# Patient Record
Sex: FEMALE | Race: WHITE | NOT HISPANIC OR LATINO
[De-identification: names, ages, dates, MRNs, and addresses within clinical notes are randomized per-mention and may not be internally consistent; named-entity substitution may affect disease eponyms.]

---

## 2019-05-15 ENCOUNTER — NON-APPOINTMENT (OUTPATIENT)
Age: 44
End: 2019-05-15

## 2019-05-15 ENCOUNTER — APPOINTMENT (OUTPATIENT)
Dept: CARDIOLOGY | Facility: CLINIC | Age: 44
End: 2019-05-15
Payer: COMMERCIAL

## 2019-05-15 ENCOUNTER — APPOINTMENT (OUTPATIENT)
Dept: CARDIOLOGY | Facility: CLINIC | Age: 44
End: 2019-05-15

## 2019-05-15 VITALS
OXYGEN SATURATION: 100 % | TEMPERATURE: 98.6 F | BODY MASS INDEX: 20.02 KG/M2 | SYSTOLIC BLOOD PRESSURE: 117 MMHG | RESPIRATION RATE: 12 BRPM | HEIGHT: 63 IN | DIASTOLIC BLOOD PRESSURE: 83 MMHG | HEART RATE: 79 BPM | WEIGHT: 113 LBS

## 2019-05-15 DIAGNOSIS — R07.89 OTHER CHEST PAIN: ICD-10-CM

## 2019-05-15 PROCEDURE — 93000 ELECTROCARDIOGRAM COMPLETE: CPT

## 2019-05-15 PROCEDURE — 99214 OFFICE O/P EST MOD 30 MIN: CPT | Mod: 25

## 2019-05-15 NOTE — DISCUSSION/SUMMARY
[___ Year(s)] : [unfilled] year(s) [FreeTextEntry1] : The patient is a 44-year-old female history of bicuspid valve, aortic regurgitation, mildly dilated aortic root with an atypical chest pain episode.\par #1 Chest pain- exercise stress test\par #2 AV/dilated root- ECHO ordered\par #3 Lipids- diet controlled, increase exercise.

## 2019-05-15 NOTE — HISTORY OF PRESENT ILLNESS
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 She had an episode of substernal chest tightness that was nonradiating. It lasted a minute or two and has not recurred. She is trying to eat a more plant based diet and exercise to improve her lipid panel.

## 2019-05-15 NOTE — PHYSICAL EXAM
[General Appearance - Well Developed] : well developed [Normal Appearance] : normal appearance [Well Groomed] : well groomed [General Appearance - Well Nourished] : well nourished [No Deformities] : no deformities [General Appearance - In No Acute Distress] : no acute distress [Normal Conjunctiva] : the conjunctiva exhibited no abnormalities [No Oral Pallor] : no oral pallor [Normal Oral Mucosa] : normal oral mucosa [Eyelids - No Xanthelasma] : the eyelids demonstrated no xanthelasmas [Normal Jugular Venous A Waves Present] : normal jugular venous A waves present [No Oral Cyanosis] : no oral cyanosis [No Jugular Venous Khan A Waves] : no jugular venous khan A waves [Normal Jugular Venous V Waves Present] : normal jugular venous V waves present [Respiration, Rhythm And Depth] : normal respiratory rhythm and effort [Auscultation Breath Sounds / Voice Sounds] : lungs were clear to auscultation bilaterally [Heart Rate And Rhythm] : heart rate and rhythm were normal [Exaggerated Use Of Accessory Muscles For Inspiration] : no accessory muscle use [Heart Sounds] : normal S1 and S2 [Abdomen Soft] : soft [Murmurs] : no murmurs present [Abdomen Mass (___ Cm)] : no abdominal mass palpated [Abdomen Tenderness] : non-tender [Nail Clubbing] : no clubbing of the fingernails [Abnormal Walk] : normal gait [Gait - Sufficient For Exercise Testing] : the gait was sufficient for exercise testing [Cyanosis, Localized] : no localized cyanosis [Petechial Hemorrhages (___cm)] : no petechial hemorrhages [] : no rash [Skin Color & Pigmentation] : normal skin color and pigmentation [No Venous Stasis] : no venous stasis [No Skin Ulcers] : no skin ulcer [Skin Lesions] : no skin lesions [No Xanthoma] : no  xanthoma was observed [Oriented To Time, Place, And Person] : oriented to person, place, and time [Affect] : the affect was normal [No Anxiety] : not feeling anxious [Mood] : the mood was normal

## 2019-05-15 NOTE — REVIEW OF SYSTEMS
[Negative] : Heme/Lymph [Shortness Of Breath] : no shortness of breath [Recent Weight Loss (___ Lbs)] : no recent weight loss [Dyspnea on exertion] : not dyspnea during exertion [Chest Pain] : no chest pain [Palpitations] : no palpitations [Lower Ext Edema] : no extremity edema

## 2019-06-19 ENCOUNTER — APPOINTMENT (OUTPATIENT)
Dept: CARDIOLOGY | Facility: CLINIC | Age: 44
End: 2019-06-19
Payer: COMMERCIAL

## 2019-06-19 PROCEDURE — 93015 CV STRESS TEST SUPVJ I&R: CPT

## 2019-06-19 PROCEDURE — 93306 TTE W/DOPPLER COMPLETE: CPT

## 2019-07-11 ENCOUNTER — FORM ENCOUNTER (OUTPATIENT)
Age: 44
End: 2019-07-11

## 2019-07-12 ENCOUNTER — OUTPATIENT (OUTPATIENT)
Dept: OUTPATIENT SERVICES | Facility: HOSPITAL | Age: 44
LOS: 1 days | End: 2019-07-12
Payer: COMMERCIAL

## 2019-07-12 ENCOUNTER — APPOINTMENT (OUTPATIENT)
Dept: CARDIOLOGY | Facility: CLINIC | Age: 44
End: 2019-07-12

## 2019-07-12 DIAGNOSIS — I71.2 THORACIC AORTIC ANEURYSM, WITHOUT RUPTURE: ICD-10-CM

## 2019-07-12 DIAGNOSIS — R07.9 CHEST PAIN, UNSPECIFIED: ICD-10-CM

## 2019-07-12 PROCEDURE — 75574 CT ANGIO HRT W/3D IMAGE: CPT | Mod: 26

## 2019-07-12 PROCEDURE — 75574 CT ANGIO HRT W/3D IMAGE: CPT

## 2019-07-15 ENCOUNTER — TRANSCRIPTION ENCOUNTER (OUTPATIENT)
Age: 44
End: 2019-07-15

## 2019-10-16 ENCOUNTER — EMERGENCY (EMERGENCY)
Facility: HOSPITAL | Age: 44
LOS: 1 days | Discharge: HOME | End: 2019-10-16
Attending: EMERGENCY MEDICINE
Payer: COMMERCIAL

## 2019-10-16 VITALS
RESPIRATION RATE: 18 BRPM | OXYGEN SATURATION: 100 % | SYSTOLIC BLOOD PRESSURE: 118 MMHG | WEIGHT: 113.98 LBS | DIASTOLIC BLOOD PRESSURE: 77 MMHG | HEIGHT: 67 IN | TEMPERATURE: 98 F | HEART RATE: 81 BPM

## 2019-10-16 LAB
ALBUMIN SERPL ELPH-MCNC: 4.2 G/DL — SIGNIFICANT CHANGE UP (ref 3.3–5.2)
ALP SERPL-CCNC: 48 U/L — SIGNIFICANT CHANGE UP (ref 40–120)
ALT FLD-CCNC: 13 U/L — SIGNIFICANT CHANGE UP
ANION GAP SERPL CALC-SCNC: 12 MMOL/L — SIGNIFICANT CHANGE UP (ref 5–17)
AST SERPL-CCNC: 20 U/L — SIGNIFICANT CHANGE UP
BILIRUB SERPL-MCNC: 0.4 MG/DL — SIGNIFICANT CHANGE UP (ref 0.4–2)
BUN SERPL-MCNC: 11 MG/DL — SIGNIFICANT CHANGE UP (ref 8–20)
CALCIUM SERPL-MCNC: 9.4 MG/DL — SIGNIFICANT CHANGE UP (ref 8.6–10.2)
CHLORIDE SERPL-SCNC: 106 MMOL/L — SIGNIFICANT CHANGE UP (ref 98–107)
CO2 SERPL-SCNC: 23 MMOL/L — SIGNIFICANT CHANGE UP (ref 22–29)
CREAT SERPL-MCNC: 0.6 MG/DL — SIGNIFICANT CHANGE UP (ref 0.5–1.3)
GLUCOSE SERPL-MCNC: 93 MG/DL — SIGNIFICANT CHANGE UP (ref 70–115)
HCT VFR BLD CALC: 40.9 % — SIGNIFICANT CHANGE UP (ref 34.5–45)
HGB BLD-MCNC: 13 G/DL — SIGNIFICANT CHANGE UP (ref 11.5–15.5)
INR BLD: 1.04 RATIO — SIGNIFICANT CHANGE UP (ref 0.88–1.16)
LIDOCAIN IGE QN: 34 U/L — SIGNIFICANT CHANGE UP (ref 22–51)
MAGNESIUM SERPL-MCNC: 2.2 MG/DL — SIGNIFICANT CHANGE UP (ref 1.8–2.6)
MCHC RBC-ENTMCNC: 27.5 PG — SIGNIFICANT CHANGE UP (ref 27–34)
MCHC RBC-ENTMCNC: 31.8 GM/DL — LOW (ref 32–36)
MCV RBC AUTO: 86.5 FL — SIGNIFICANT CHANGE UP (ref 80–100)
NT-PROBNP SERPL-SCNC: 106 PG/ML — SIGNIFICANT CHANGE UP (ref 0–300)
PLATELET # BLD AUTO: 249 K/UL — SIGNIFICANT CHANGE UP (ref 150–400)
POTASSIUM SERPL-MCNC: 4 MMOL/L — SIGNIFICANT CHANGE UP (ref 3.5–5.3)
POTASSIUM SERPL-SCNC: 4 MMOL/L — SIGNIFICANT CHANGE UP (ref 3.5–5.3)
PROT SERPL-MCNC: 6.6 G/DL — SIGNIFICANT CHANGE UP (ref 6.6–8.7)
PROTHROM AB SERPL-ACNC: 12 SEC — SIGNIFICANT CHANGE UP (ref 10–12.9)
RBC # BLD: 4.73 M/UL — SIGNIFICANT CHANGE UP (ref 3.8–5.2)
RBC # FLD: 12.6 % — SIGNIFICANT CHANGE UP (ref 10.3–14.5)
SODIUM SERPL-SCNC: 141 MMOL/L — SIGNIFICANT CHANGE UP (ref 135–145)
TROPONIN T SERPL-MCNC: <0.01 NG/ML — SIGNIFICANT CHANGE UP (ref 0–0.06)
WBC # BLD: 4.85 K/UL — SIGNIFICANT CHANGE UP (ref 3.8–10.5)
WBC # FLD AUTO: 4.85 K/UL — SIGNIFICANT CHANGE UP (ref 3.8–10.5)

## 2019-10-16 PROCEDURE — 99285 EMERGENCY DEPT VISIT HI MDM: CPT

## 2019-10-16 PROCEDURE — 93010 ELECTROCARDIOGRAM REPORT: CPT

## 2019-10-16 PROCEDURE — 93306 TTE W/DOPPLER COMPLETE: CPT | Mod: 26

## 2019-10-16 PROCEDURE — 71046 X-RAY EXAM CHEST 2 VIEWS: CPT | Mod: 26

## 2019-10-16 NOTE — ED ADULT TRIAGE NOTE - CHIEF COMPLAINT QUOTE
"I missed a dose of my metoprolol on sunday. I started feeling weird on monday.  I woke up this morning and was short of breath, dizzy and I wasn't feeling right".  States she has a hx of 4.4 cm ascending aortic aneurysm x12 year and a bicuspid aortic valve.  c/o cold symptoms as well, cough x 1 week.

## 2019-10-16 NOTE — ED STATDOCS - PROGRESS NOTE DETAILS
43 y/o with ascending aortic aneurysm c/o intermittent episodes of difficulty catching breath. also presenting with minimal URI symptoms last week. Patient concerned about her heart. Vitals stable. Will be sent to main ER on monitor for further assessment.

## 2019-10-17 VITALS
HEART RATE: 77 BPM | SYSTOLIC BLOOD PRESSURE: 100 MMHG | DIASTOLIC BLOOD PRESSURE: 64 MMHG | RESPIRATION RATE: 20 BRPM | TEMPERATURE: 98 F | OXYGEN SATURATION: 99 %

## 2019-10-17 DIAGNOSIS — I71.2 THORACIC AORTIC ANEURYSM, WITHOUT RUPTURE: ICD-10-CM

## 2019-10-17 DIAGNOSIS — Q23.1 CONGENITAL INSUFFICIENCY OF AORTIC VALVE: ICD-10-CM

## 2019-10-17 LAB
HCG SERPL-ACNC: <4 MIU/ML — SIGNIFICANT CHANGE UP
TROPONIN T SERPL-MCNC: <0.01 NG/ML — SIGNIFICANT CHANGE UP (ref 0–0.06)

## 2019-10-17 PROCEDURE — 93005 ELECTROCARDIOGRAM TRACING: CPT

## 2019-10-17 PROCEDURE — 99284 EMERGENCY DEPT VISIT MOD MDM: CPT | Mod: 25

## 2019-10-17 PROCEDURE — 84484 ASSAY OF TROPONIN QUANT: CPT

## 2019-10-17 PROCEDURE — 74174 CTA ABD&PLVS W/CONTRAST: CPT | Mod: 26

## 2019-10-17 PROCEDURE — 36415 COLL VENOUS BLD VENIPUNCTURE: CPT

## 2019-10-17 PROCEDURE — G0378: CPT

## 2019-10-17 PROCEDURE — 85610 PROTHROMBIN TIME: CPT

## 2019-10-17 PROCEDURE — 85027 COMPLETE CBC AUTOMATED: CPT

## 2019-10-17 PROCEDURE — 83880 ASSAY OF NATRIURETIC PEPTIDE: CPT

## 2019-10-17 PROCEDURE — 84702 CHORIONIC GONADOTROPIN TEST: CPT

## 2019-10-17 PROCEDURE — 80053 COMPREHEN METABOLIC PANEL: CPT

## 2019-10-17 PROCEDURE — 83690 ASSAY OF LIPASE: CPT

## 2019-10-17 PROCEDURE — 99236 HOSP IP/OBS SAME DATE HI 85: CPT

## 2019-10-17 PROCEDURE — 93306 TTE W/DOPPLER COMPLETE: CPT

## 2019-10-17 PROCEDURE — 71046 X-RAY EXAM CHEST 2 VIEWS: CPT

## 2019-10-17 PROCEDURE — 71275 CT ANGIOGRAPHY CHEST: CPT

## 2019-10-17 PROCEDURE — 71275 CT ANGIOGRAPHY CHEST: CPT | Mod: 26

## 2019-10-17 PROCEDURE — 83735 ASSAY OF MAGNESIUM: CPT

## 2019-10-17 PROCEDURE — 74174 CTA ABD&PLVS W/CONTRAST: CPT

## 2019-10-17 RX ORDER — METOPROLOL TARTRATE 50 MG
12.5 TABLET ORAL DAILY
Refills: 0 | Status: DISCONTINUED | OUTPATIENT
Start: 2019-10-17 | End: 2019-10-22

## 2019-10-17 NOTE — ED CDU PROVIDER DISPOSITION NOTE - PATIENT PORTAL LINK FT
You can access the FollowMyHealth Patient Portal offered by [facility] by registering at the following website: [email or web address]. By joining [facility]’s FollowMyHealth portal, you will also be able to view your health information using other applications (apps) compatible with our system.

## 2019-10-17 NOTE — ED CDU PROVIDER INITIAL DAY NOTE - MEDICAL DECISION MAKING DETAILS
female with hx of thoracic aortic aneurysm with recent cold symptoms and missed dose of BB, anxious. pending TTE reading. female with hx of thoracic aortic aneurysm with recent cold symptoms and missed dose of BB, anxious 2/2 to cardiac hx. vitals stable pending TTE reading.

## 2019-10-17 NOTE — ED PROVIDER NOTE - CLINICAL SUMMARY MEDICAL DECISION MAKING FREE TEXT BOX
43 y/o pt with an aortic aneurysm presents to the ED complaining of shortness of breath. Followed routinely by a cardiologist who has done an echo. Presents with "not feeling well" after missing one dose of Metoprolol 12.5mg. Plan to do labs, chest X-Ray, repeat echo, and reevaluate.

## 2019-10-17 NOTE — CONSULT NOTE ADULT - SUBJECTIVE AND OBJECTIVE BOX
Cardiologist: Inga Alejandro    Surgeon: Niyah    Consult requesting by: ED    HISTORY OF PRESENT ILLNESS:  44F, known bicuspid AV and ascending aortic aneurysm p/w 2 episodes of acute onset SOB waking her up from sleep, resolved on its own in a few minutes. TTE in ED with 4.6cm ascending aneurysm which pt reported was increased in size. CTA chest measured 4.3cm, unchanged from July CT. Pt states she was first diagnosed after having palpitations after her first son was born ~12 years ago.  She has been followed routinely since then.  She has been taking metoprolol 12.5q12 as prescribed by her cardiologist for her aneurysm.  She missed a dose the other night, because her prescription ran out.  She also reports getting over a chest cold and thinks those two things may have contributed to her SOB. She also reports "i just felt weird" and so she came to the ED because she was nervous. Pt reports being an active person and just finished a season of volleyball with no symptoms of CP, palpitations or SOB during exertion.    Of note, pt has copy of a CT chest from 2011 which reports ascending aorta was 4.0 at that time.   + non productive cough from her cold, otherwise no complaints. 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     FAMILY HISTORY:  father CAD  no fhx of anuerysms/bicuspid AV    Review of Systems  CONSTITUTIONAL:  Fevers[ ] chills[ ] sweats[ ] fatigue[ ] weight loss[ ] weight gain [ ]        NEGATIVE [X ]   NEURO:  parathesias[ ] seizures [ ]  syncope [ ]  confusion [ ]                                         NEGATIVE [X ]   EYES: glasses[ ]  blurry vision[ ]  discharge[ ] pain[ ] glaucoma [ ]                                   NEGATIVE [X ]    ENMT:  difficulty hearing [ ]  vertigo[ ]  dysphagia[ ] epistaxis[ ] recent dental work [ ]      NEGATIVE [X ]   CV:  chest pain[ ] palpitations[ ] MOSER [ ] diaphoresis [ ]                                                 NEGATIVE [X ]   RESPIRATORY:  wheezing[ ] SOB[X ] cough [X ] sputum[ ] hemoptysis[ ]                                                                 GI:  nausea[ ]  vommiting [ ]  diarrhea[ ] constipation [ ] melena [ ]                               NEGATIVE [X ]   : hematuria[ ]  dysuria[ ] urgency[ ] incontinence[ ]                                                  NEGATIVE [X ]   MUSKULOSKELETAL:  arthritis[ ]  joint swelling [ ] muscle weakness [ ]                          NEGATIVE [X ]   SKIN/BREAST:  rash[ ] itching [ ]  hair loss[ ] masses[ ]                                                NEGATIVE [X ]   PSYCH:  dementia [ ] depresion [ ] anxiety[ ]                                                              NEGATIVE [X ]   HEME/LYMPH:  bruises easily[ ] enlarged lymph nodes[ ] tender lymph nodes[ ]           NEGATIVE [X ]   ENDOCRINE:  cold intolerance[ ] heat intolerance[ ] polydipsia[ ]                                 NEGATIVE [X ]     PHYSICAL EXAM  Vital Signs Last 24 Hrs  T(C): 37 (17 Oct 2019 02:30), Max: 37 (17 Oct 2019 02:30)  T(F): 98.6 (17 Oct 2019 02:30), Max: 98.6 (17 Oct 2019 02:30)  HR: 62 (17 Oct 2019 06:25) (62 - 81)  BP: 98/68 (17 Oct 2019 06:25) (98/68 - 118/77)  BP(mean): --  RR: 18 (17 Oct 2019 06:25) (18 - 18)  SpO2: 98% (17 Oct 2019 06:25) (98% - 100%)    CONSTITUTIONAL:   NAD, WD, WN  Neuro: A&Ox3, non focal, speech clear and intact               Eyes: PERRL, EOMI, scleara anicteric, no erythema or drainage  Neck: no JVD or thyromegaly, no carotid bruits  Chest: CTA b/l, no wheezing/rales/rhonchi, no use of accessory muscles                                                                              CV: S1S2 RRR, no murmurs  Abdominal Aorta: non palpable                                                                                    GI: + BS soft NT ND                                                                                                      Extremities: no edema/clubbing/cyanosis   vascular: 2+ radial pulses b/l  SKIN : no rashes, no diaphoresis  Psych: calm                                                           LABS:                        13.0   4.85  )-----------( 249      ( 16 Oct 2019 22:58 )             40.9     10-16    141  |  106  |  11.0  ----------------------------<  93  4.0   |  23.0  |  0.60    Ca    9.4      16 Oct 2019 22:58  Mg     2.2     10-16    TPro  6.6  /  Alb  4.2  /  TBili  0.4  /  DBili  x   /  AST  20  /  ALT  13  /  AlkPhos  48  10-16    PT/INR - ( 16 Oct 2019 22:58 )   PT: 12.0 sec;   INR: 1.04 ratio             CARDIAC MARKERS ( 17 Oct 2019 10:25 )  x     / <0.01 ng/mL / x     / x     / x      CARDIAC MARKERS ( 16 Oct 2019 22:58 )  x     / <0.01 ng/mL / x     / x     / x        Serum Pro-Brain Natriuretic Peptide: 106 pg/mL (10-16-19 @ 22:58)    TTE:   Left Ventricle: The left ventricular internal cavity size is normal. Left   ventricular wall thickness is normal.  Global LV systolic function was normal. Left ventricular ejection   fraction, by visual estimation, is 65 to 70%. Spectral Doppler shows   normal pattern of LV diastolic filling.  Right Ventricle: The right ventricular size is normal. RV wall thickness   is normal. RV systolic function is normal.  Left Atrium: Normal left atrial size.  Right Atrium: There is right atrial enlargement.  Pericardium: Trivial pericardial effusion is present.  Mitral Valve: The mitral valve is normalin structure. Mitral leaflet   mobility is normal. Trace mitral valve regurgitation is seen.  Tricuspid Valve: The tricuspid valve is normal in structure. Trivial   tricuspid regurgitation is visualized.  Aortic Valve: The aortic valve is not well visualized but appears to be   bicuspid with fusion of the right and left coronary cusps. Peak   transaortic gradient equals 13.1 mmHg, mean transaortic gradient equals   6.0 mmHg, the calculated aortic valve area equals 1.72 cm² by the   continuity equation consistent with mild aortic stenosis. Moderate aortic   valve regurgitation is seen. Dimensionless index = 0.50  Pulmonic Valve: The pulmonic valve was not well visualized. Mild pulmonic   valve regurgitation.  Aorta: The aortic root is normal in size and structure. There is   dilatation of the ascending aorta.  Pulmonary Artery: The pulmonary artery is of normal size and origin.  Venous: The inferior vena cava is normal. The inferior vena cava was   normal sized, with respiratory size variation greater than 50%.      CTA Chest:  Dilated ascending thoracic aorta measuring 4.3 cm at the level of the   main pulmonary artery, unchanged since 7/12/2019.    No thoracic or abdominal aortic dissection.

## 2019-10-17 NOTE — ED CDU PROVIDER DISPOSITION NOTE - ATTENDING CONTRIBUTION TO CARE
observation eval for sob  testing dod not reveal need for further evaluation at this timesafe to dc and fu as outpt

## 2019-10-17 NOTE — ED CDU PROVIDER INITIAL DAY NOTE - DETAILS
pending TTE read, was performed at 10/16 10pm. Dr leslie spoke with stephani for tte results, states that he reads only emergent echos

## 2019-10-17 NOTE — CONSULT NOTE ADULT - ATTENDING COMMENTS
CT scan echocardiogram personally reviewed. The patient will be scheduled to return for aneurysm surveillance with repeat CT angiogram and echocardiogram in 6 months time.

## 2019-10-17 NOTE — ED CDU PROVIDER INITIAL DAY NOTE - NS ED ROS FT
no weight change, no fever or chills  no recent travel, no recent abx, + sick contacts  no recent change in medications  no rash, no bruises  no visual changes no eye discharge  + cough cold or congestion,   + sob, no chest pain  + stress test, no cath  no orthopnea, no pnd  no abd pain, no n/v/d  no hematuria, no change in urinary habits  no joint pain, no deformity  no headache, no paresthesia

## 2019-10-17 NOTE — ED CDU PROVIDER INITIAL DAY NOTE - ATTENDING CONTRIBUTION TO CARE
pt with sob. Hx dilated aortic root. pe as documented. work up ordered. no distress this am.  agree with treatment plan

## 2019-10-17 NOTE — ED PROVIDER NOTE - PHYSICAL EXAMINATION
Constitutional : Appears comfortably, talking in full sentences  Head :NC AT , no swelling  Eyes :eomi, no swelling  Mouth :mm moist, mild swelling on the right side of the pharynx   Neck : supple, trachea in midline  Chest :Micheal air entry, symm chest expansion, no distress  Heart :S1 S2 distant  Abdomen :abd soft, non tender  Musc/Skel :ext no swelling, no deformity, no spine tenderness, distal pulses present  Neuro  :AAO 3 no focal deficits Constitutional : Appears anxious, pt is preoccupied by her underlying medical condition, talking in full sentences  Head :NC AT , no swelling  Eyes :eomi, no swelling, not erythema or swelling to the tympanic membrane   Mouth :mm moist, mild swelling on the right side of the pharynx   Neck : supple, trachea in midline  Chest :Micheal air entry, symm chest expansion, no distress  Heart :S1 S2 distant  Abdomen :abd soft, non tender  Musc/Skel :ext no swelling, no deformity, no spine tenderness, distal pulses present  Neuro  :AAO 3 no focal deficits

## 2019-10-17 NOTE — ED PROVIDER NOTE - NS ED ROS FT
no weight change, no fever or chills  no recent travel, no recent abox, + sick contacts  no recent change in medications  no rash, no bruises  no visual changes no eye discharge  + cough cold or congestion,   + sob, no chest pain  follows with cardiology - Dr. Inga Alejandro  + stress test, no cath  no orthopnea, no pnd  no abd pain, no n/v/d  no hematuria, no change in urinary habits  no joint pain, no deformity  no headache, no paresthesia

## 2019-10-17 NOTE — ED CDU PROVIDER DISPOSITION NOTE - CLINICAL COURSE
45 y/o pt with an HX aortic aneurysm presents to the ED complaining of shortness of breath. Pt is currently on Metoprolol 12.5mg. Pt states that after giving birth 11 years ago, she began to have palpations from not sleeping at night. Pt went to a cardiologist who did an echo and found an 4.4 thoracic ascending aortic aneurysm. Last year she went in and was told that it has been getting bigger by .2. PT placed in obs to evaluate aneurysm pt with possible increased size, CT sx called to see pt requesting a CTA that was found to have no change PT cleared by CT sx for dc home with follow up to her private cards, given CT sx name for follow up in 6 months,  PT will be DC home with continued medical management, educated about when to return to the ED if needed. PT verbalizes that he understands all instructions and results.

## 2019-10-17 NOTE — ED ADULT NURSE REASSESSMENT NOTE - NS ED NURSE REASSESS COMMENT FT1
CT surg at bedside for eval at this time
MD Pruitt at bedside to update pt on results of testing and imaging.
Pt is sleeping in stretcher comfortably at this time, easily arousable to verbal stimuli, no apparent distress noted. Pt denies any pain or discomforts at this time. Pt again denies chest pain, and respirations are spontaneous even and unlabored.  Pt safety maintained, side rail in place, stretcher wheels locked and stretcher in lowest position. Call bell within reach and RN reinforced use of call bell when needed.  RN continues to update pt on plan of care. Pt expresses understanding to RN.
Assuming care from previous RN, pt A&O x's 4, resp even and unlabored, color good, showing NSR on monitor, denies chest pain, denies SOB at this time, offers no complaints, awaiting echo read and dispo, pt aware of plan of care, will continue to monitor for safety and comfort

## 2019-10-17 NOTE — ED CDU PROVIDER INITIAL DAY NOTE - PHYSICAL EXAMINATION
Constitutional : Appears anxious, pt is preoccupied by her underlying medical condition, talking in full sentences  Head :NC AT , no swelling  Eyes :eomi, no swelling, not erythema or swelling to the tympanic membrane   Mouth :mm moist, mild swelling on the right side of the pharynx   Neck : supple, trachea in midline  Chest :Micheal air entry, symm chest expansion, no distress  Heart :S1 S2 distant  Abdomen :abd soft, non tender  Musc/Skel :ext no swelling, no deformity, no spine tenderness, distal pulses present  Neuro:AAO 3 no focal deficits

## 2019-10-17 NOTE — CONSULT NOTE ADULT - PROBLEM SELECTOR RECOMMENDATION 9
d/w Dr. Harper  pt should have repeat CTA chest, and Echo in 6 months, then follow up with him, pt given business card  advised pt to f/u with her cardiologist in 1-2 weeks  instructed pt if she develops any CP, worsening SOB, palpitations she should return to the ED  will add pt to aortic registry if she is not already

## 2019-10-17 NOTE — ED CDU PROVIDER INITIAL DAY NOTE - PROGRESS NOTE DETAILS
PT signed out to [facility]IN[facility] grewal after lengthy discussion about case with DOMINIC Blue PT ECHO previewed old imagining reviewed pt with possible increase in size of Aortic Anurysum, CT sx called to consult on pt. PT seen resting comfortably in no acute distress, no acute complaints at this time, PT tolerating PO intake,  PT informed of all results, PT cleared by CT sx for dc home with follow up to her private cards, given CT sx name for follow up in 6 months,  PT will be DC home with continued medical management, educated about when to return to the ED if needed. PT verbalizes that he understands all instructions and results.

## 2019-10-17 NOTE — ED PROVIDER NOTE - OBJECTIVE STATEMENT
Source: Patient  43 y/o pt with an aortic aneurysm presents to the ED complaining of shortness of breath. Pt is currently on Metoprolol? 12.5mg. Pt states that she skipped a dose on Sunday and was nervous about having reactions from not taking it. The next day she got it filled and took her medication Monday and Tuesday night and started to feel strange. Pt describes it as a "throat closing" feeling. Pt reports going to urgent care today and was diagnosed with a post nasal drip but was worried about her heart so presented to the ED. Pt states that she was feeling lethargic, productive cough with cold hands and feet. Pt had a recent sick contact with her 7 year old son.   Pt states that after giving birth 11 years ago, she began to have palpations from not sleeping at night. Pt went to a cardiologist who did an echo and found an 4.4 thoracic ascending aortic aneurysm. Last year she went in and was told that it has been getting bigger by .2.   No recent travel. No recent change in medications or ABx use. Source: Patient and   45 y/o pt with an aortic aneurysm presents to the ED complaining of shortness of breath. Pt is currently on Metoprolol? 12.5mg. Pt states that she skipped a dose on Sunday and was nervous about having reactions from not taking it. The next day she got it filled and took her medication Monday and Tuesday night and started to feel strange. Pt describes it as a "throat closing" feeling. Pt reports going to urgent care today and was diagnosed with a post nasal drip but was worried about her heart so presented to the ED. Pt states that she was feeling lethargic, productive cough with cold hands and feet. Pt had a recent sick contact with her 7 year old son.   Pt states that after giving birth 11 years ago, she began to have palpations from not sleeping at night. Pt went to a cardiologist who did an echo and found an 4.4 thoracic ascending aortic aneurysm. Last year she went in and was told that it has been getting bigger by .2.   No recent travel. No recent change in medications or ABx use. Source: Patient and   43 y/o pt with an aortic aneurysm presents to the ED complaining of shortness of breath. Pt is currently on Metoprolol 12.5mg. Pt states that she skipped a dose on Sunday and was nervous about having reactions from not taking it. The next day she got it filled and took her medication Monday and Tuesday night and started to feel strange. Pt describes it as a "throat closing" feeling. Pt reports going to urgent care today and was diagnosed with a post nasal drip but was worried about her heart so presented to the ED. Pt states that she was feeling lethargic, productive cough with cold hands and feet. Pt had a recent sick contact with her 7 year old son.   Pt states that after giving birth 11 years ago, she began to have palpations from not sleeping at night. Pt went to a cardiologist who did an echo and found an 4.4 thoracic ascending aortic aneurysm. Last year she went in and was told that it has been getting bigger by .2.   No recent travel. No recent change in medications or ABx use.

## 2019-10-17 NOTE — ED CDU PROVIDER INITIAL DAY NOTE - OBJECTIVE STATEMENT
45 y/o female pt with an aortic aneurysm presents to the ED complaining of shortness of breath. Pt is currently on Metoprolol 12.5mg. Pt states that she skipped a dose on Sunday and was nervous about having reactions from not taking it. The next day she got it filled and took her medication Monday and Tuesday night and started to feel strange. Pt describes it as a "throat closing" feeling. Pt reports going to urgent care today and was diagnosed with a post nasal drip but was worried about her heart so presented to the ED. Pt states that she was feeling lethargic, productive cough with cold hands and feet. Pt had a recent sick contact with her 7 year old son.   Pt states that after giving birth 11 years ago, she began to have palpations from not sleeping at night. Pt went to a cardiologist who did an echo and found an 4.4 thoracic ascending aortic aneurysm. Last year she went in and was told that it has been getting bigger by 0.2. hx of bicuspid valve calcification   No recent travel. No recent change in medications or ABx use  Cards: - Dr. Inga Alejandro ([facility]) 45 y/o female hx of ascending aortic aneurysm with yearly echo reads presents to the ED complaining of shortness of breat associated with recent "chest cold" including cough congestion. son is sick at home with similar cold symptoms. pt states that she came in last evening b/c she had been experiencing some sob earlier yesterday. no associated cp. felt slightly better after taking a midday nap but was concerned due to her cardiac hx that he sob was 2/2 something heart related. pt state sthat she had missed one of her doses of n Metoprolol 12.5mg the sunday before onset of symptoms . no recent travel no hemoptysis   dx with 4.4 thoracic ascending aortic aneurysm 11 yrs ago . Last year she went in and was told that it has been getting bigger by 0.2. hx of bicuspid valve calcification   No recent travel. No recent change in medications or ABx use  Cards: - Dr. Inga Alejandro ([facility])  fmhx father with lung ca and cardiac bypass

## 2019-10-18 PROBLEM — Q23.1 CONGENITAL INSUFFICIENCY OF AORTIC VALVE: Chronic | Status: ACTIVE | Noted: 2019-10-16

## 2019-10-18 PROBLEM — I71.9 AORTIC ANEURYSM OF UNSPECIFIED SITE, WITHOUT RUPTURE: Chronic | Status: ACTIVE | Noted: 2019-10-16

## 2019-10-24 ENCOUNTER — APPOINTMENT (OUTPATIENT)
Dept: CARDIOTHORACIC SURGERY | Facility: CLINIC | Age: 44
End: 2019-10-24

## 2019-11-13 ENCOUNTER — APPOINTMENT (OUTPATIENT)
Dept: CARDIOTHORACIC SURGERY | Facility: CLINIC | Age: 44
End: 2019-11-13

## 2020-07-09 ENCOUNTER — APPOINTMENT (OUTPATIENT)
Dept: CARDIOLOGY | Facility: CLINIC | Age: 45
End: 2020-07-09

## 2020-07-09 ENCOUNTER — APPOINTMENT (OUTPATIENT)
Dept: CARDIOLOGY | Facility: CLINIC | Age: 45
End: 2020-07-09
Payer: COMMERCIAL

## 2020-07-09 PROCEDURE — 99214 OFFICE O/P EST MOD 30 MIN: CPT | Mod: 95

## 2020-07-09 NOTE — HISTORY OF PRESENT ILLNESS
[Home] : at home, [unfilled] , at the time of the visit. [Verbal consent obtained from patient] : the patient, [unfilled] [Medical Office: ([facility])___] : at the medical office located in  [FreeTextEntry1] : Samantha started to have her throat close in October on metoprolol. It happened three times and she wound up in the ER at [place]. Stopped it at that time and felt better ever since. CT Chest aorta 4.3. She is concerned about Aortic valve as well.

## 2020-07-09 NOTE — DISCUSSION/SUMMARY
[FreeTextEntry1] : The patient is a 45-year-old female history of bicuspid valve, aortic regurgitation, mildly dilated aortic root with side effect to metoprolol\par #1 AR/AS- bicuspid valve, ECHO ordered\par #2 AV/dilated root- 4.3 on CT 10/19, start diltiazem initially at 30mg, ECHO ordered\par #3 Lipids- diet controlled, increase exercise.

## 2020-07-09 NOTE — PHYSICAL EXAM
[General Appearance - Well Developed] : well developed [Normal Appearance] : normal appearance [Well Groomed] : well groomed [General Appearance - Well Nourished] : well nourished [No Deformities] : no deformities [General Appearance - In No Acute Distress] : no acute distress [Normal Conjunctiva] : the conjunctiva exhibited no abnormalities [Eyelids - No Xanthelasma] : the eyelids demonstrated no xanthelasmas [Normal Oral Mucosa] : normal oral mucosa [No Oral Pallor] : no oral pallor [No Oral Cyanosis] : no oral cyanosis [Normal Jugular Venous A Waves Present] : normal jugular venous A waves present [Normal Jugular Venous V Waves Present] : normal jugular venous V waves present [No Jugular Venous Khan A Waves] : no jugular venous khan A waves [Mood] : the mood was normal [Affect] : the affect was normal [Oriented To Time, Place, And Person] : oriented to person, place, and time [No Anxiety] : not feeling anxious

## 2020-07-13 ENCOUNTER — APPOINTMENT (OUTPATIENT)
Dept: CARDIOLOGY | Facility: CLINIC | Age: 45
End: 2020-07-13
Payer: COMMERCIAL

## 2020-07-13 PROCEDURE — 93306 TTE W/DOPPLER COMPLETE: CPT

## 2021-01-25 ENCOUNTER — APPOINTMENT (OUTPATIENT)
Dept: CARDIOLOGY | Facility: CLINIC | Age: 46
End: 2021-01-25
Payer: COMMERCIAL

## 2021-01-25 PROCEDURE — 99072 ADDL SUPL MATRL&STAF TM PHE: CPT

## 2021-01-25 PROCEDURE — 93306 TTE W/DOPPLER COMPLETE: CPT

## 2021-04-12 ENCOUNTER — APPOINTMENT (OUTPATIENT)
Dept: CARDIOLOGY | Facility: CLINIC | Age: 46
End: 2021-04-12
Payer: COMMERCIAL

## 2021-04-12 PROCEDURE — 99214 OFFICE O/P EST MOD 30 MIN: CPT | Mod: 95

## 2021-04-12 NOTE — DISCUSSION/SUMMARY
[FreeTextEntry1] : The patient is a 45-year-old female history of bicuspid valve, aortic regurgitation, mildly dilated aortic root with side effect to metoprolol\par #1 AR/AS- bicuspid valve, ECHO stable, repeat 1/2022\par #2 AV/dilated root- 4.3 on CT 10/19, start diltiazem initially at 30mg, ECHO ordered\par #3 Lipids- diet controlled, resume volleyball. may have a glass of wine.

## 2021-04-12 NOTE — HISTORY OF PRESENT ILLNESS
[FreeTextEntry1] : Samantha has been feeling well. She did not take her diltiazem. She has not been playing volleyball. She was afraid to drink any wine. She was told to not eat grapefruit.  [Home] : at home, [unfilled] , at the time of the visit. [Medical Office: ([facility])___] : at the medical office located in  [Verbal consent obtained from patient] : the patient, [unfilled]

## 2021-04-12 NOTE — PHYSICAL EXAM
[General Appearance - Well Developed] : well developed [Normal Appearance] : normal appearance [Well Groomed] : well groomed [General Appearance - Well Nourished] : well nourished [No Deformities] : no deformities [General Appearance - In No Acute Distress] : no acute distress [Normal Conjunctiva] : the conjunctiva exhibited no abnormalities [Eyelids - No Xanthelasma] : the eyelids demonstrated no xanthelasmas [Normal Oral Mucosa] : normal oral mucosa [No Oral Pallor] : no oral pallor [No Oral Cyanosis] : no oral cyanosis [Normal Jugular Venous A Waves Present] : normal jugular venous A waves present [Normal Jugular Venous V Waves Present] : normal jugular venous V waves present [No Jugular Venous Khan A Waves] : no jugular venous khan A waves [Oriented To Time, Place, And Person] : oriented to person, place, and time [Affect] : the affect was normal [Mood] : the mood was normal [No Anxiety] : not feeling anxious

## 2021-07-23 ENCOUNTER — EMERGENCY (EMERGENCY)
Facility: HOSPITAL | Age: 46
LOS: 1 days | Discharge: HOME | End: 2021-07-23
Attending: EMERGENCY MEDICINE
Payer: COMMERCIAL

## 2021-07-23 VITALS
SYSTOLIC BLOOD PRESSURE: 146 MMHG | OXYGEN SATURATION: 100 % | HEART RATE: 87 BPM | HEIGHT: 67 IN | TEMPERATURE: 98 F | RESPIRATION RATE: 19 BRPM | WEIGHT: 112.44 LBS | DIASTOLIC BLOOD PRESSURE: 89 MMHG

## 2021-07-23 PROCEDURE — G1004: CPT

## 2021-07-23 PROCEDURE — 70450 CT HEAD/BRAIN W/O DYE: CPT | Mod: 26,MF

## 2021-07-23 PROCEDURE — 93005 ELECTROCARDIOGRAM TRACING: CPT

## 2021-07-23 PROCEDURE — 70450 CT HEAD/BRAIN W/O DYE: CPT | Mod: MF

## 2021-07-23 PROCEDURE — 99284 EMERGENCY DEPT VISIT MOD MDM: CPT

## 2021-07-23 PROCEDURE — 99284 EMERGENCY DEPT VISIT MOD MDM: CPT | Mod: 25

## 2021-07-23 PROCEDURE — 93010 ELECTROCARDIOGRAM REPORT: CPT

## 2021-07-23 NOTE — ED STATDOCS - NS ED ROS FT
Review of Systems  •	CONSTITUTIONAL - no  fever, no diaphoresis, no weight change  •	SKIN - no rash  •	HEMATOLOGIC - no bleeding, no bruising  •	EYES - no eye pain, +vision change   •	ENT - no change in hearing, no pain  •	RESPIRATORY - no shortness of breath, no cough  •	CARDIAC - no chest pain, no palpitations  •	GI - no abd pain, no nausea, no vomiting, no diarrhea, no constipation, no bleeding  •	GENITO-URINARY - no discharge, no dysuria; no hematuria,   •	ENDO - no polydipsia, no polyuria, no heat/no cold intolerance  •	MUSCULOSKELETAL - no joint pain, no swelling, no redness +Left Upper Extremity "sensation"  •	NEUROLOGIC - no weakness, + headache, no anesthesia, no paresthesias  •	PSYCH - no anxiety, non suicidal, non homicidal, no hallucination, no depression

## 2021-07-23 NOTE — ED STATDOCS - ATTENDING CONTRIBUTION TO CARE
I, Phong Hendricks, performed the initial face to face bedside interview with this patient regarding history of present illness, review of symptoms and relevant past medical, social and family history.  I completed an independent physical examination.  I was the initial provider who evaluated this patient. I have signed out the follow up of any pending tests (i.e. labs, radiological studies) to the ACP.  I have communicated the patient’s plan of care and disposition with the ACP.

## 2021-07-23 NOTE — ED STATDOCS - PROGRESS NOTE DETAILS
PA NOTE: No emergent findings on CT scan. Advised to follow up with Dr. Guzman and return to ED for any new or worsening symptoms.

## 2021-07-23 NOTE — ED STATDOCS - CLINICAL SUMMARY MEDICAL DECISION MAKING FREE TEXT BOX
Pt with intermittent HA, twitching, left arm discomfort, very anxious. no focal neuro deficit. Will get CT.

## 2021-07-23 NOTE — ED ADULT TRIAGE NOTE - CHIEF COMPLAINT QUOTE
Pt. complaining of intermittent headache, vision changes, left lip twitching since wednesday.  Pt. states lip twitching began intermittently today.  Pt. with history of headaches with vision changes states "I was nervous and wanted to make sure it wasn't a stroke."  All symptoms have resolved on arrival to ED except "feeling of something on my left arm." Pt. denies numbness or tingling to lue

## 2021-07-23 NOTE — ED STATDOCS - CARE PROVIDER_API CALL
Pelon Guzman; PhD)  Neurology; Vascular Neurology  [address] [place] [address]  Phone: [phone]  Fax: [phone]  Follow Up Time:

## 2021-07-23 NOTE — ED STATDOCS - NSFOLLOWUPINSTRUCTIONS_ED_ALL_ED_FT
Headache    A headache is pain or discomfort felt around the head or neck area. The specific cause of a headache may not be found as there are many types including tension headaches, migraine headaches, and cluster headaches. Watch your condition for any changes. Things you can do to manage your pain include taking over the counter and prescription medications as instructed by your health care provider, lying down in a dark quiet room, limiting stress, getting regular sleep, and refraining from alcohol and tobacco products.    SEEK IMMEDIATE MEDICAL CARE IF YOU HAVE ANY OF THE FOLLOWING SYMPTOMS: fever, vomiting, stiff neck, loss of vision, problems with speech, muscle weakness, loss of balance, trouble walking, passing out, or confusion.    Follow up with Dr. Guzman

## 2021-07-23 NOTE — ED STATDOCS - OBJECTIVE STATEMENT
45y/o F with PMHx of Aortic aneurysm presents to the ED c/o Left Upper Extremity "sensation" which occurred 4 days ago. Pt endorses HA 4 days ago that lasted a full day with assoc. vision changes that resolved after hydrating with water, states Left Upper Extremity tingling started at the same time but has been persistent. Pt additionally endorses intermittent lip twitching today. She reports prior "pinpoint" sensation to LE which resolved with massage. She reports history of similar HA with visual changes prior which resolves when hydrating. Pt endorses white flashes in vision that are intermittent and recurrent which she relates to stress or dehydration. No prior follow up with Neurologist for symptoms. Pt Denies slurred speech, difficulty speaking.

## 2021-07-23 NOTE — ED STATDOCS - PATIENT PORTAL LINK FT
You can access the FollowMyHealth Patient Portal offered by [facility] by registering at the following website: [email or web address]. By joining [facility]’s FollowMyHealth portal, you will also be able to view your health information using other applications (apps) compatible with our system.

## 2021-07-23 NOTE — ED STATDOCS - PHYSICAL EXAMINATION
VITAL SIGNS: I have reviewed nursing notes and confirm.  CONSTITUTIONAL:  in no acute distress.  SKIN: Skin exam is warm and dry, no acute rash.  HEAD: Normocephalic; atraumatic.  EYES: PERRL, EOM intact; conjunctiva and sclera clear.  ENT: No nasal discharge; airway clear. Throat clear.  NECK: Supple; non tender.    CARD: Regular rate and rhythm.  RESP: No wheezes,  no rales or rhonchi.   ABD:  soft; non-distended; non-tender;   EXT: Normal ROM. No clubbing, cyanosis or edema.  NEURO: Alert, oriented. Grossly unremarkable. No focal deficits.  AO x3, EOMI, no facial droop, no pronator drift, finger-to-nose normal, sensations intact, moves all extremities, normal gait   PSYCH: Cooperative, appropriate.

## 2021-09-08 ENCOUNTER — NON-APPOINTMENT (OUTPATIENT)
Age: 46
End: 2021-09-08

## 2021-09-08 ENCOUNTER — APPOINTMENT (OUTPATIENT)
Dept: CARDIOLOGY | Facility: CLINIC | Age: 46
End: 2021-09-08
Payer: COMMERCIAL

## 2021-09-08 VITALS
HEIGHT: 63 IN | RESPIRATION RATE: 12 BRPM | BODY MASS INDEX: 19.49 KG/M2 | HEART RATE: 81 BPM | WEIGHT: 110 LBS | OXYGEN SATURATION: 98 % | TEMPERATURE: 97.5 F | DIASTOLIC BLOOD PRESSURE: 78 MMHG | SYSTOLIC BLOOD PRESSURE: 119 MMHG

## 2021-09-08 PROCEDURE — 93000 ELECTROCARDIOGRAM COMPLETE: CPT

## 2021-09-08 PROCEDURE — 99215 OFFICE O/P EST HI 40 MIN: CPT

## 2021-09-08 NOTE — REVIEW OF SYSTEMS
[SOB] : no shortness of breath [Dyspnea on exertion] : not dyspnea during exertion [Chest Discomfort] : no chest discomfort [Lower Ext Edema] : no extremity edema [Leg Claudication] : no intermittent leg claudication [Palpitations] : palpitations [Syncope] : no syncope [Negative] : Heme/Lymph

## 2021-09-08 NOTE — DISCUSSION/SUMMARY
[FreeTextEntry1] : The patient is a 46-year-old female history of bicuspid valve, aortic regurgitation, mildly dilated aortic root with palpitations and dizziness.\par #1 AR/AS- bicuspid valve, ECHO stable, repeat 1/2022\par #2 AV/dilated root- 4.3 on CT 10/19, continue diltiazem 30mg\par #3 Lipids- diet controlled, resume volleyball. may have a glass of wine.\par #4 General- palpitations may be secondary to anxiety, event monitor given for one week, reassurance and emotional support provided, now COVID vaccinated.

## 2021-09-08 NOTE — HISTORY OF PRESENT ILLNESS
[FreeTextEntry1] : Samantha has been having palpitations intermittently and feeling tired. She also started to get dizziness. She had been spray painting. She was anxious about vaccine. After first vaccine and headache and tingling of lip. She just got second vaccine. She has a lot on her plate with son going to high school for the first time. Switching ministeries. Not sleeping well. She was seen in ER and CT negative. BP has been low.

## 2021-09-24 ENCOUNTER — APPOINTMENT (OUTPATIENT)
Dept: NEUROLOGY | Facility: CLINIC | Age: 46
End: 2021-09-24
Payer: COMMERCIAL

## 2021-09-24 VITALS
SYSTOLIC BLOOD PRESSURE: 115 MMHG | WEIGHT: 114 LBS | HEIGHT: 63 IN | TEMPERATURE: 97.3 F | BODY MASS INDEX: 20.2 KG/M2 | DIASTOLIC BLOOD PRESSURE: 80 MMHG

## 2021-09-24 DIAGNOSIS — R20.2 PARESTHESIA OF SKIN: ICD-10-CM

## 2021-09-24 DIAGNOSIS — G44.89 OTHER HEADACHE SYNDROME: ICD-10-CM

## 2021-09-24 PROCEDURE — 99204 OFFICE O/P NEW MOD 45 MIN: CPT

## 2021-09-24 RX ORDER — DILTIAZEM HYDROCHLORIDE 30 MG/1
30 TABLET ORAL
Qty: 60 | Refills: 2 | Status: COMPLETED | COMMUNITY
Start: 2020-07-09 | End: 2021-09-24

## 2021-09-24 RX ORDER — METOPROLOL SUCCINATE 25 MG/1
25 TABLET, EXTENDED RELEASE ORAL
Qty: 45 | Refills: 3 | Status: COMPLETED | COMMUNITY
Start: 2019-06-26 | End: 2021-09-24

## 2022-09-08 ENCOUNTER — APPOINTMENT (OUTPATIENT)
Dept: CARDIOLOGY | Facility: CLINIC | Age: 47
End: 2022-09-08

## 2022-10-12 ENCOUNTER — NON-APPOINTMENT (OUTPATIENT)
Age: 47
End: 2022-10-12

## 2022-10-12 ENCOUNTER — APPOINTMENT (OUTPATIENT)
Dept: CARDIOLOGY | Facility: CLINIC | Age: 47
End: 2022-10-12

## 2022-10-12 VITALS
RESPIRATION RATE: 12 BRPM | HEIGHT: 63 IN | WEIGHT: 112 LBS | OXYGEN SATURATION: 98 % | BODY MASS INDEX: 19.84 KG/M2 | HEART RATE: 73 BPM

## 2022-10-12 DIAGNOSIS — I71.2 THORACIC AORTIC ANEURYSM, W/OUT RUPTURE: ICD-10-CM

## 2022-10-12 DIAGNOSIS — I35.1 NONRHEUMATIC AORTIC (VALVE) INSUFFICIENCY: ICD-10-CM

## 2022-10-12 PROCEDURE — 93000 ELECTROCARDIOGRAM COMPLETE: CPT

## 2022-10-12 PROCEDURE — 99213 OFFICE O/P EST LOW 20 MIN: CPT | Mod: 25

## 2022-10-12 PROCEDURE — 93306 TTE W/DOPPLER COMPLETE: CPT

## 2022-10-12 NOTE — DISCUSSION/SUMMARY
[FreeTextEntry1] : The patient is a 47-year-old female history of bicuspid valve, aortic regurgitation, mildly dilated aortic root with palpitations and dizziness.\par #1 AR/AS- bicuspid valve, ECHO preliminary today unchanged\par #2 AV/dilated root- 4.3 on CT 10/19,off diltiazem 30mg\par #3 Lipids- diet controlled, c/w volleyball.\par #4 General-  COVID vaccinated., s/p COVID [EKG obtained to assist in diagnosis and management of assessed problem(s)] : EKG obtained to assist in diagnosis and management of assessed problem(s)

## 2022-10-12 NOTE — HISTORY OF PRESENT ILLNESS
[FreeTextEntry1] : Samantha has been feeling well. Had COVID twice. Last a few months ago with mild URI. Played volleyball all summer. Started every game with fast heart rate and got better over time. No resting palpitations.

## 2023-02-22 NOTE — ED CDU PROVIDER INITIAL DAY NOTE - PROGRESS NOTE ADDITIONAL1
----- Message from Demetrice Farias sent at 2/22/2023  2:36 PM CST -----  Contact: self  Type: Sooner Appointment Request        Caller is requesting a sooner appointment. Caller declined first available appointment listed below. Caller will not accept being placed on the waitlist and is requesting a message be sent to doctor.        Name of Caller: Patient   Best Call Back Number: [phone]  Pharmacy:   [id number] - [place], [place] - [address] AT [facility] [address]  [address] [facility] [address]  [place] [address]  Phone: [phone] Fax: [phone]  Additional Information: Patient states she has chronic migraines, has them all the times. States she can take them but its being persistent has had them for years. Nothing over the counter really works. Pt states it happens about once a week/ every other week. Plz send something inf or pt or let her know she needs to be seen first. Thanks       
Spoke with patient she has been scheduled for 3/9 since she will be out of town for 2 weeks. Patient wanted to wait because of insurance purposes  
Additional Progress Note...

## 2023-07-21 NOTE — ED ADULT NURSE NOTE - ISOLATION TYPE:
[Cold Symptoms] : cold symptoms [Mild] : mild [___ Days ago] : [unfilled] days ago [Constant] : constant [Congestion] : congestion [Anorexia] : anorexia [Fatigue] : fatigue [Headache] : headache [Stable] : stable Apixaban/Eliquis increases your risk for bleeding. Notify your doctor if you experience any of the following side effects: bleeding, coughing or vomiting blood, red or black stool, unexpected pain or swelling, itching or hives, chest pain, chest tightness, trouble breathing, changes in how much or how often you urinate, red or pink urine, numbness or tingling in your feet, or unusual muscle weakness. When Apixaban/Eliquis is taken with other medicines, they can affect how it works. Taking other medications such as aspirin, blood thinners, nonsteroidal anti-inflammatories, and medications that treat depression can increase your risk of bleeding. It is very important to tell your health care provider about all of the other medicines, including over-the-counter medications, herbs, and vitamins you are taking. DO NOT start, stop, or change the dosage of any medicine, including over-the-counter medicines, vitamins, and herbal products without your doctor’s approval. Any products containing aspirin or are nonsteroidal anti-inflammatories lessen the blood’s ability to form clots and add to the effect of Apixaban/Eliquis. Never take aspirin or medicines that contain aspirin without speaking to your doctor. [FreeTextEntry8] : PT PRESENTS WITH 1 DAY OF CONSTITUTIONAL SX ,BODY ACHES,DECREASED APPETITE AND HA None

## 2023-10-18 ENCOUNTER — APPOINTMENT (OUTPATIENT)
Dept: CARDIOLOGY | Facility: CLINIC | Age: 48
End: 2023-10-18

## 2023-10-23 ENCOUNTER — APPOINTMENT (OUTPATIENT)
Dept: CARDIOLOGY | Facility: CLINIC | Age: 48
End: 2023-10-23
Payer: COMMERCIAL

## 2023-10-23 DIAGNOSIS — R00.2 PALPITATIONS: ICD-10-CM

## 2023-10-23 DIAGNOSIS — Q23.1 CONGENITAL INSUFFICIENCY OF AORTIC VALVE: ICD-10-CM

## 2023-10-23 PROCEDURE — 99213 OFFICE O/P EST LOW 20 MIN: CPT | Mod: 95

## 2024-09-30 ENCOUNTER — APPOINTMENT (OUTPATIENT)
Dept: CARDIOLOGY | Facility: CLINIC | Age: 49
End: 2024-09-30
Payer: COMMERCIAL

## 2024-09-30 DIAGNOSIS — R00.2 PALPITATIONS: ICD-10-CM

## 2024-09-30 DIAGNOSIS — Q23.1 CONGENITAL INSUFFICIENCY OF AORTIC VALVE: ICD-10-CM

## 2024-09-30 PROCEDURE — 99213 OFFICE O/P EST LOW 20 MIN: CPT | Mod: 95

## 2024-09-30 NOTE — DISCUSSION/SUMMARY
[FreeTextEntry1] : The patient is a 49-year-old female bicuspid valve, AR, mildly dilated aortic root with palpitations that are infrequent. #1 AR/AS- bicuspid valve, MVP, root unchanged, repeat ordered, palpitations resolved. #2 AV/dilated root- 4.3 on CT 10/19, #3 Lipids- diet controlled, c/w volleyball but only in summer. #4 General- COVID vaccinated.,

## 2024-09-30 NOTE — HISTORY OF PRESENT ILLNESS
[Home] : at home, [unfilled] , at the time of the visit. [Medical Office: ([facility])___] : at the medical office located in  [Verbal consent obtained from patient] : the patient, [unfilled] [FreeTextEntry1] : Samantha is doing well. She went on a pilgrimage which was spiritual. She started to feel palpitations on the trip. She is starting to have irregular periods. She called NP gyn who felt it was secondary to the anxiety of leaving her family for this trip. She started to try to get more rest and no alcohol. Has not happened  since. Whenever she started to feel her heart rate was too fast then would slow down and was fine.

## 2024-09-30 NOTE — HISTORY OF PRESENT ILLNESS
[Home] : at home, [unfilled] , at the time of the visit. [Medical Office: ([facility])___] : at the medical office located in  [Verbal consent obtained from patient] : the patient, [unfilled] [FreeTextEntry1] : Samantha is doing well. She went on a pilgrimage which was spiritual. She started to feel palpitations on the trip. She is starting to have irregular periods. She called NP gyn who felt it was secondary to the anxiety of leaving her family for this trip. She started to try to get more rest and no alcohol. Has not happened  since. Whenever she started to feel her heart rate was too fast then would slow down and was fine.

## 2024-11-27 ENCOUNTER — APPOINTMENT (OUTPATIENT)
Dept: CARDIOLOGY | Facility: CLINIC | Age: 49
End: 2024-11-27

## 2024-11-27 PROCEDURE — 93306 TTE W/DOPPLER COMPLETE: CPT
